# Patient Record
Sex: FEMALE | URBAN - METROPOLITAN AREA
[De-identification: names, ages, dates, MRNs, and addresses within clinical notes are randomized per-mention and may not be internally consistent; named-entity substitution may affect disease eponyms.]

---

## 2021-10-05 ENCOUNTER — APPOINTMENT (RX ONLY)
Dept: URBAN - METROPOLITAN AREA CLINIC 369 | Facility: CLINIC | Age: 51
Setting detail: DERMATOLOGY
End: 2021-10-05

## 2021-10-05 DIAGNOSIS — I83.9 ASYMPTOMATIC VARICOSE VEINS OF LOWER EXTREMITIES: ICD-10-CM

## 2021-10-05 PROBLEM — I83.90 ASYMPTOMATIC VARICOSE VEINS OF UNSPECIFIED LOWER EXTREMITY: Status: ACTIVE | Noted: 2021-10-05

## 2021-10-05 PROCEDURE — ? DEFER

## 2021-10-05 PROCEDURE — ? DOPPLER US

## 2021-10-05 PROCEDURE — ? COUNSELING

## 2021-10-05 NOTE — PROCEDURE: MIPS QUALITY
Detail Level: Detailed
Quality 358: Patient-Centered Surgical Risk Assessment And Communication: Documentation of patient-specific risk assessment with a risk calculator based on multi-institutional clinical data, the specific risk calculator used, and communication of risk assessment from risk calculator with the patient or family.
Quality 226: Preventive Care And Screening: Tobacco Use: Screening And Cessation Intervention: Patient screened for tobacco use and is an ex/non-smoker

## 2021-10-05 NOTE — PROCEDURE: DEFER
Introduction Text (Please End With A Colon): The following procedure was deferred:
Detail Level: Detailed
Procedure To Be Performed At Next Visit: EVLA

## 2021-10-05 NOTE — PROCEDURE: DOPPLER US
Left Distal Gsv Size: 3 mm
Right Mid Gsv Size: 4 mm
Right Sfj Size: 5 mm
Right Spj Reflux (Sec): no reflux
Left Sfj Size: 7 mm
Left Sfj Reflux (Sec): 5 seconds reflux
Left Spj Size: 2 mm
Use Ssv Or Lsv: SSV
Left Mid Gsv Reflux (Sec): less than 3 seconds reflux
Other Right Leg Doppler Findings: AASV 3mm no reflux
Detail Level: Zone
Ultrasound Used (Optional): Siemens Acuson X300

## 2021-10-08 ENCOUNTER — APPOINTMENT (RX ONLY)
Dept: URBAN - METROPOLITAN AREA CLINIC 368 | Facility: CLINIC | Age: 51
Setting detail: DERMATOLOGY
End: 2021-10-08

## 2021-10-08 DIAGNOSIS — I83.9 ASYMPTOMATIC VARICOSE VEINS OF LOWER EXTREMITIES: ICD-10-CM

## 2021-10-08 PROBLEM — I83.93 ASYMPTOMATIC VARICOSE VEINS OF BILATERAL LOWER EXTREMITIES: Status: ACTIVE | Noted: 2021-10-08

## 2021-10-08 PROCEDURE — ? SCLEROTHERAPY

## 2021-10-08 PROCEDURE — ? COUNSELING

## 2021-10-08 ASSESSMENT — LOCATION DETAILED DESCRIPTION DERM
LOCATION DETAILED: LEFT PROXIMAL PRETIBIAL REGION
LOCATION DETAILED: RIGHT DISTAL POSTERIOR THIGH
LOCATION DETAILED: RIGHT PROXIMAL PRETIBIAL REGION

## 2021-10-08 ASSESSMENT — LOCATION SIMPLE DESCRIPTION DERM
LOCATION SIMPLE: LEFT PRETIBIAL REGION
LOCATION SIMPLE: RIGHT POSTERIOR THIGH
LOCATION SIMPLE: RIGHT PRETIBIAL REGION

## 2021-10-08 ASSESSMENT — LOCATION ZONE DERM: LOCATION ZONE: LEG

## 2021-10-08 NOTE — PROCEDURE: SCLEROTHERAPY
Post-Care Instructions: Wear prescription compression stockings for up to 1 week.  No exercising for 1 week.
Sclerosant Volume (Cc): 5
Detail Level: Zone
Number Of Injections (Will Not Render If 0): 0
Render Post Care In Note: No
Sclerosant Volume (Cc): 10
Sclerosant (A) %: 0.10
Sclerosant Volume (Cc): 1

## 2021-10-29 ENCOUNTER — APPOINTMENT (RX ONLY)
Dept: URBAN - METROPOLITAN AREA CLINIC 368 | Facility: CLINIC | Age: 51
Setting detail: DERMATOLOGY
End: 2021-10-29

## 2021-10-29 DIAGNOSIS — I83.9 ASYMPTOMATIC VARICOSE VEINS OF LOWER EXTREMITIES: ICD-10-CM

## 2021-10-29 PROBLEM — I83.93 ASYMPTOMATIC VARICOSE VEINS OF BILATERAL LOWER EXTREMITIES: Status: ACTIVE | Noted: 2021-10-29

## 2021-10-29 PROCEDURE — ? SCLEROTHERAPY

## 2021-10-29 PROCEDURE — ? COUNSELING

## 2021-10-29 ASSESSMENT — LOCATION DETAILED DESCRIPTION DERM
LOCATION DETAILED: RIGHT MEDIAL PROXIMAL PRETIBIAL REGION
LOCATION DETAILED: LEFT PROXIMAL PRETIBIAL REGION
LOCATION DETAILED: RIGHT PROXIMAL PRETIBIAL REGION
LOCATION DETAILED: RIGHT DISTAL POSTERIOR THIGH

## 2021-10-29 ASSESSMENT — LOCATION SIMPLE DESCRIPTION DERM
LOCATION SIMPLE: RIGHT POSTERIOR THIGH
LOCATION SIMPLE: RIGHT PRETIBIAL REGION
LOCATION SIMPLE: LEFT PRETIBIAL REGION

## 2021-10-29 ASSESSMENT — LOCATION ZONE DERM: LOCATION ZONE: LEG

## 2021-10-29 NOTE — PROCEDURE: SCLEROTHERAPY
Sclerosant Volume (Cc): 10
Render Post Care In Note: No
Number Of Injections (Will Not Render If 0): 0
Sclerosant (A) %: 0.10
Sclerosant Volume (Cc): 1
Sclerosant Volume (Cc): 5
Post-Care Instructions: Wear prescription compression stockings for up to 1 week.  No exercising for 1 week.
Detail Level: Zone